# Patient Record
Sex: FEMALE | Race: BLACK OR AFRICAN AMERICAN | NOT HISPANIC OR LATINO | Employment: UNEMPLOYED | URBAN - METROPOLITAN AREA
[De-identification: names, ages, dates, MRNs, and addresses within clinical notes are randomized per-mention and may not be internally consistent; named-entity substitution may affect disease eponyms.]

---

## 2021-08-20 ENCOUNTER — CLINICAL SUPPORT (OUTPATIENT)
Dept: URGENT CARE | Facility: CLINIC | Age: 21
End: 2021-08-20
Payer: MEDICAID

## 2021-08-20 ENCOUNTER — TELEPHONE (OUTPATIENT)
Dept: URGENT CARE | Facility: CLINIC | Age: 21
End: 2021-08-20

## 2021-08-20 DIAGNOSIS — Z11.59 ENCOUNTER FOR SCREENING FOR OTHER VIRAL DISEASES: Primary | ICD-10-CM

## 2021-08-20 DIAGNOSIS — U07.1 COVID-19 VIRUS INFECTION: Primary | ICD-10-CM

## 2021-08-20 LAB
CTP QC/QA: YES
SARS-COV-2 RDRP RESP QL NAA+PROBE: POSITIVE

## 2021-08-20 PROCEDURE — 99211 PR OFFICE/OUTPT VISIT, EST, LEVL I: ICD-10-PCS | Mod: S$GLB,,, | Performed by: NURSE PRACTITIONER

## 2021-08-20 PROCEDURE — U0002 COVID-19 LAB TEST NON-CDC: HCPCS | Mod: QW,S$GLB,, | Performed by: NURSE PRACTITIONER

## 2021-08-20 PROCEDURE — 99211 OFF/OP EST MAY X REQ PHY/QHP: CPT | Mod: S$GLB,,, | Performed by: NURSE PRACTITIONER

## 2021-08-20 PROCEDURE — U0002: ICD-10-PCS | Mod: QW,S$GLB,, | Performed by: NURSE PRACTITIONER

## 2022-03-29 ENCOUNTER — PATIENT MESSAGE (OUTPATIENT)
Dept: RESEARCH | Facility: HOSPITAL | Age: 22
End: 2022-03-29
Payer: MEDICAID

## 2023-09-16 ENCOUNTER — HOSPITAL ENCOUNTER (EMERGENCY)
Facility: HOSPITAL | Age: 23
Discharge: HOME | End: 2023-09-16
Attending: EMERGENCY MEDICINE
Payer: MEDICAID

## 2023-09-16 VITALS
DIASTOLIC BLOOD PRESSURE: 82 MMHG | OXYGEN SATURATION: 99 % | RESPIRATION RATE: 18 BRPM | HEART RATE: 91 BPM | BODY MASS INDEX: 20.55 KG/M2 | HEIGHT: 63 IN | WEIGHT: 116 LBS | TEMPERATURE: 98 F | SYSTOLIC BLOOD PRESSURE: 126 MMHG

## 2023-09-16 DIAGNOSIS — F25.0 SCHIZOAFFECTIVE DISORDER, BIPOLAR TYPE: Primary | ICD-10-CM

## 2023-09-16 DIAGNOSIS — G47.00 INSOMNIA, UNSPECIFIED TYPE: ICD-10-CM

## 2023-09-16 LAB
ALBUMIN SERPL BCP-MCNC: 4.4 G/DL (ref 3.5–5.2)
ALP SERPL-CCNC: 68 U/L (ref 55–135)
ALT SERPL W/O P-5'-P-CCNC: 12 U/L (ref 10–44)
AMPHET+METHAMPHET UR QL: NEGATIVE
ANION GAP SERPL CALC-SCNC: 10 MMOL/L (ref 8–16)
APAP SERPL-MCNC: 11 UG/ML (ref 10–20)
AST SERPL-CCNC: 14 U/L (ref 10–40)
B-HCG UR QL: NEGATIVE
BARBITURATES UR QL SCN>200 NG/ML: NEGATIVE
BASOPHILS # BLD AUTO: 0.09 K/UL (ref 0–0.2)
BASOPHILS NFR BLD: 1.1 % (ref 0–1.9)
BENZODIAZ UR QL SCN>200 NG/ML: NEGATIVE
BILIRUB SERPL-MCNC: 0.6 MG/DL (ref 0.1–1)
BILIRUB UR QL STRIP: NEGATIVE
BUN SERPL-MCNC: 6 MG/DL (ref 6–20)
BZE UR QL SCN: NEGATIVE
CALCIUM SERPL-MCNC: 9.6 MG/DL (ref 8.7–10.5)
CANNABINOIDS UR QL SCN: NEGATIVE
CHLORIDE SERPL-SCNC: 104 MMOL/L (ref 95–110)
CLARITY UR: ABNORMAL
CO2 SERPL-SCNC: 22 MMOL/L (ref 23–29)
COLOR UR: YELLOW
CREAT SERPL-MCNC: 0.8 MG/DL (ref 0.5–1.4)
CREAT UR-MCNC: 165.7 MG/DL (ref 15–325)
CTP QC/QA: YES
DIFFERENTIAL METHOD: NORMAL
EOSINOPHIL # BLD AUTO: 0.1 K/UL (ref 0–0.5)
EOSINOPHIL NFR BLD: 1.7 % (ref 0–8)
ERYTHROCYTE [DISTWIDTH] IN BLOOD BY AUTOMATED COUNT: 12.9 % (ref 11.5–14.5)
EST. GFR  (NO RACE VARIABLE): >60 ML/MIN/1.73 M^2
ETHANOL SERPL-MCNC: <10 MG/DL
GLUCOSE SERPL-MCNC: 85 MG/DL (ref 70–110)
GLUCOSE UR QL STRIP: NEGATIVE
HCT VFR BLD AUTO: 39.1 % (ref 37–48.5)
HGB BLD-MCNC: 12.6 G/DL (ref 12–16)
HGB UR QL STRIP: NEGATIVE
IMM GRANULOCYTES # BLD AUTO: 0.03 K/UL (ref 0–0.04)
IMM GRANULOCYTES NFR BLD AUTO: 0.4 % (ref 0–0.5)
KETONES UR QL STRIP: NEGATIVE
LEUKOCYTE ESTERASE UR QL STRIP: NEGATIVE
LYMPHOCYTES # BLD AUTO: 1.7 K/UL (ref 1–4.8)
LYMPHOCYTES NFR BLD: 22.2 % (ref 18–48)
MCH RBC QN AUTO: 28.3 PG (ref 27–31)
MCHC RBC AUTO-ENTMCNC: 32.2 G/DL (ref 32–36)
MCV RBC AUTO: 88 FL (ref 82–98)
METHADONE UR QL SCN>300 NG/ML: NEGATIVE
MONOCYTES # BLD AUTO: 0.7 K/UL (ref 0.3–1)
MONOCYTES NFR BLD: 8.7 % (ref 4–15)
NEUTROPHILS # BLD AUTO: 5.2 K/UL (ref 1.8–7.7)
NEUTROPHILS NFR BLD: 65.9 % (ref 38–73)
NITRITE UR QL STRIP: NEGATIVE
NRBC BLD-RTO: 0 /100 WBC
OPIATES UR QL SCN: NEGATIVE
PCP UR QL SCN>25 NG/ML: NEGATIVE
PH UR STRIP: 6 [PH] (ref 5–8)
PLATELET # BLD AUTO: 226 K/UL (ref 150–450)
PMV BLD AUTO: 9.7 FL (ref 9.2–12.9)
POTASSIUM SERPL-SCNC: 4.1 MMOL/L (ref 3.5–5.1)
PROT SERPL-MCNC: 7.5 G/DL (ref 6–8.4)
PROT UR QL STRIP: NEGATIVE
RBC # BLD AUTO: 4.46 M/UL (ref 4–5.4)
SODIUM SERPL-SCNC: 136 MMOL/L (ref 136–145)
SP GR UR STRIP: 1.02 (ref 1–1.03)
TOXICOLOGY INFORMATION: NORMAL
TSH SERPL DL<=0.005 MIU/L-ACNC: 0.88 UIU/ML (ref 0.4–4)
URN SPEC COLLECT METH UR: ABNORMAL
UROBILINOGEN UR STRIP-ACNC: NEGATIVE EU/DL
WBC # BLD AUTO: 7.85 K/UL (ref 3.9–12.7)

## 2023-09-16 PROCEDURE — 85025 COMPLETE CBC W/AUTO DIFF WBC: CPT | Performed by: EMERGENCY MEDICINE

## 2023-09-16 PROCEDURE — 81003 URINALYSIS AUTO W/O SCOPE: CPT | Performed by: EMERGENCY MEDICINE

## 2023-09-16 PROCEDURE — 80307 DRUG TEST PRSMV CHEM ANLYZR: CPT | Performed by: EMERGENCY MEDICINE

## 2023-09-16 PROCEDURE — 81025 URINE PREGNANCY TEST: CPT | Performed by: EMERGENCY MEDICINE

## 2023-09-16 PROCEDURE — 99284 EMERGENCY DEPT VISIT MOD MDM: CPT

## 2023-09-16 PROCEDURE — 80053 COMPREHEN METABOLIC PANEL: CPT | Performed by: EMERGENCY MEDICINE

## 2023-09-16 PROCEDURE — 82077 ASSAY SPEC XCP UR&BREATH IA: CPT | Performed by: EMERGENCY MEDICINE

## 2023-09-16 PROCEDURE — 84443 ASSAY THYROID STIM HORMONE: CPT | Performed by: EMERGENCY MEDICINE

## 2023-09-16 PROCEDURE — 25000003 PHARM REV CODE 250: Performed by: EMERGENCY MEDICINE

## 2023-09-16 PROCEDURE — 80143 DRUG ASSAY ACETAMINOPHEN: CPT | Performed by: EMERGENCY MEDICINE

## 2023-09-16 RX ORDER — ARIPIPRAZOLE 5 MG/1
5 TABLET ORAL DAILY
Qty: 30 TABLET | Refills: 1 | Status: ON HOLD | OUTPATIENT
Start: 2023-09-16 | End: 2023-12-27 | Stop reason: HOSPADM

## 2023-09-16 RX ORDER — OXCARBAZEPINE 150 MG/1
150 TABLET, FILM COATED ORAL 2 TIMES DAILY
Qty: 60 TABLET | Refills: 1 | Status: SHIPPED | OUTPATIENT
Start: 2023-09-16 | End: 2024-09-15

## 2023-09-16 RX ORDER — MIRTAZAPINE 15 MG/1
15 TABLET, FILM COATED ORAL NIGHTLY
Qty: 30 TABLET | Refills: 1 | Status: ON HOLD | OUTPATIENT
Start: 2023-09-16 | End: 2023-12-27 | Stop reason: HOSPADM

## 2023-09-16 RX ORDER — IBUPROFEN 600 MG/1
600 TABLET ORAL
Status: COMPLETED | OUTPATIENT
Start: 2023-09-16 | End: 2023-09-16

## 2023-09-16 RX ORDER — FLUOXETINE HYDROCHLORIDE 20 MG/1
20 CAPSULE ORAL DAILY
Qty: 30 CAPSULE | Refills: 1 | Status: ON HOLD | OUTPATIENT
Start: 2023-09-16 | End: 2023-12-27 | Stop reason: HOSPADM

## 2023-09-16 RX ADMIN — IBUPROFEN 600 MG: 600 TABLET ORAL at 11:09

## 2023-09-16 NOTE — DISCHARGE INSTRUCTIONS
Follow up with your psychiatrist this week. Return to this ER for any problems.     [facility] [phone], After hours, nights, and weekends, you can reach a primary care on-call provider at [phone] and a behavioral health mobile crisis line at [phone]:  [facility]  [address] [facility]  [address]  [place]  [address] [phone]  Hours: Monday - Friday  7:00 a.m. - 5:00 p.m.          MENTAL HEALTH/ADDICTIVE DISORDERS  REFERRAL RECOMMENDATIONS    - AA ([phone]) [email or web address] or NA ([phone])    - [facility] Intensive Outpatient Program [phone], option 2    - Places for Outpatient Addictive Disorders and Mental Health Treatment in [facility]:    ACER ([place], [place], [place]; accepts Medicaid, commercial insurance) [phone]    ARRNO ([place]) [phone], [address] [facility] [phone]; Crisis [phone] - Call for options A-E:    ? [facility], [address] [facility] [place] [address]    ? [facility], [address] [place] [address]    ? [facility], [address] [place] [address],    ? [facility], 2nd floor [address] [place] [address]    ? [facility] [place], [address], [place] [address]    ? [facility], [address], [place] [address]    [facility], [address] [place] [address], [phone]    [facility], [place] [phone]    [facility] (Mental & General), [facility], [phone]    [facility] (Mental & General Health, not only HIV+, 3 MATHEUS locations) [phone]    [facility] [place], [address] [facility], [address], [phone]     [facility] [place], [address] [facility] [place], [phone], [phone]    [facility] (Methadone Maintenance)    ? [address] [place] [address], [phone]    ? [place] [address] [phone]    - Addiction and Mental Health Treatment in Other [facility]:    [facility] [place], [address] [place], [phone]    [facility], [phone], [address] [facility], [address], [phone]    [facility]. [address] [facility], [phone]    [facility], [address] [facility], [phone]    [facility] HSA    ? [facility], [address] [facility], [phone] ([facility])    ? [facility], [address] [facility], [phone] ([facility])    ? [facility], [address] [facility], [address], [place], [phone] ([place], [place], [facility])    ? [facility], [address], [place], [phone] ([facility])    [facility] - [facility] Hotline [phone], [phone]    ? [facility], [address] [place]    ? [facility], [address], [place]    ? [facility], [address] [facility], [place]    ? [facility], [address] [facility] [address] [place]    ? [facility], [address], [place]    [facility], [address], [place] [phone]    [facility], [address] [facility] [phone]    [facility], [address] [facility], [phone]    [facility], [address] [facility], [phone]    - Residential Addictive Disorders Treatment (call every day until you get  in):    [facility] [address] [facility], [phone]    [facility] (men only) [address] [facility], [place], [phone]    [facility], [address] [facility], mens program [phone], womens program [phone]    [facility], [address], [phone]    [facility] (Female only) [address] [facility], [phone]    [facility] (IOP, residential, low cost, MCaid) [address], [phone]    [facility] (Men only, [phone]), [address] [facility] [address], [place]    [facility] (Pregnant/women with or without children, [phone])    [facility] (Women only), [address] [facility], [phone]    [facility] (men only), [place] [phone]    - Inpatient Rehabs (out of area)    [facility], [phone]     [facility], [phone]    [facility] [place], [phone]    [place], [place] ([phone])    [place] (nr [place]) [phone]    *In case of suicidal thinking, return to ED and/or call or text the COPE LINE at [phone]*

## 2023-09-16 NOTE — ED PROVIDER NOTES
Encounter Date: 9/16/2023       History     Chief Complaint   Patient presents with    Psychiatric Evaluation     EMS called to 21yo female that has been having issues sleeping x2 weeks and began having AH yesterday. Has not had her meds x3 days. Hx of schizophrenia and bipolar. Also reports some abdominal pain and nausea today.      22-year-old female who has schizoaffective and bipolar disorder states [facility].  She has not been on her medications since she was [place] to [place] them.  She states she sometimes hears hallucinations specifically people laughing.  She is not currently hearing hallucinations at this time.  She denies feeling persecuted by them.  She has denies any suicidal ideation.  She denies any homicidal ideation.  Her main complaint is insomnia.  She is currently staying at a friend's in sleeping on their couch.  She admits that environment might be related to why she is having trouble sleeping.  She asked to go back to [facility] so they can send her prescriptions to a pharmacy closer to where she is staying.       Review of patient's allergies indicates:  No Known Allergies  Past Medical History:   Diagnosis Date    Psychiatric disorder      No past surgical history on file.  No family history on file.     Review of Systems   Constitutional:  Negative for fever.   HENT:  Negative for sore throat.    Eyes:  Negative for visual disturbance.   Respiratory:  Negative for shortness of breath.    Cardiovascular:  Negative for chest pain.   Gastrointestinal:  Negative for abdominal pain.   Genitourinary:  Negative for difficulty urinating.   Musculoskeletal:  Negative for back pain.   Skin:  Negative for rash.   Neurological:  Negative for headaches.   Psychiatric/Behavioral:  Positive for sleep disturbance. Negative for behavioral problems, confusion, self-injury  and suicidal ideas. The patient is not nervous/anxious.        Physical Exam     Initial Vitals [09/16/23 0754]   BP Pulse Resp Temp SpO2   126/82 91 18 98.3 °F (36.8 °C) 99 %      MAP       --         Physical Exam    Nursing note and vitals reviewed.  Constitutional: She appears well-developed and well-nourished.   Eyes: EOM are normal. Pupils are equal, round, and reactive to light.   Neck: Neck supple. No thyromegaly present. No JVD present.   Normal range of motion.  Cardiovascular:  Normal rate, regular rhythm, normal heart sounds and intact distal pulses.     Exam reveals no gallop and no friction rub.       No murmur heard.  Pulmonary/Chest: Breath sounds normal. No respiratory distress.   Abdominal: Abdomen is soft. Bowel sounds are normal. There is no abdominal tenderness.   Musculoskeletal:         General: No tenderness or edema. Normal range of motion.      Cervical back: Normal range of motion and neck supple.     Neurological: She is alert and oriented to person, place, and time. She has normal strength.   Skin: Skin is warm and dry.   Psychiatric:   Patient has good eye contact.  She does not appear to be pressed.  She does not appear to be anxious.  She is not appear to be psychotic.  She is not reacting to internal stimuli.         ED Course   Procedures  Labs Reviewed   COMPREHENSIVE METABOLIC PANEL - Abnormal; Notable for the following components:       Result Value    CO2 22 (*)     All other components within normal limits   URINALYSIS, REFLEX TO URINE CULTURE - Abnormal; Notable for the following components:    Appearance, UA Hazy (*)     All other components within normal limits    Narrative:     Specimen Source->Urine   CBC W/ AUTO DIFFERENTIAL   TSH   DRUG SCREEN PANEL, URINE EMERGENCY    Narrative:     Specimen Source->Urine   ALCOHOL,MEDICAL (ETHANOL)   ACETAMINOPHEN LEVEL   POCT URINE PREGNANCY          Imaging Results    None          Medications   ibuprofen tablet 600 mg (600 mg Oral  Given 9/16/23 1135)     Medical Decision Making  Amount and/or Complexity of Data Reviewed  Labs: ordered.    Risk  Prescription drug management.    I called the pharmacy she states her prescriptions were at.  They had 4 prescriptions ready to be filled.  I will refill these medications and patient is asking for them in paper script to get them filled.  She states she will start taking them.  She states she has a psychiatrist to follow up with.                           Clinical Impression:   Final diagnoses:  [F25.0] Schizoaffective disorder, bipolar type (Primary)  [G47.00] Insomnia, unspecified type        ED Disposition Condition    Discharge Stable          ED Prescriptions       Medication Sig Dispense Start Date End Date Auth. Provider    OXcarbazepine (TRILEPTAL) 150 MG Tab Take 1 tablet (150 mg total) by mouth 2 (two) times daily. 60 tablet 9/16/2023 9/15/2024 Simba Johnson MD    mirtazapine (REMERON) 15 MG tablet Take 1 tablet (15 mg total) by mouth every evening. 30 tablet 9/16/2023 9/15/2024 Simba Johnson MD    ARIPiprazole (ABILIFY) 5 MG Tab Take 1 tablet (5 mg total) by mouth once daily. 30 tablet 9/16/2023 9/15/2024 Simba Johnson MD    FLUoxetine 20 MG capsule Take 1 capsule (20 mg total) by mouth once daily. 30 capsule 9/16/2023 9/15/2024 Simba Johnson MD          Follow-up Information       Follow up With Specialties Details Why Contact Info    [facility] -  Schedule an appointment as soon as possible for a visit   [address] [facility]  [address]  [place] [address]  [phone]      [facility], Psychiatry, Psychology Schedule an appointment as soon as possible for a visit   [address]  [place] [address]  [phone]               Simba Johnson MD  09/16/23 8434

## 2023-12-18 ENCOUNTER — HOSPITAL ENCOUNTER (EMERGENCY)
Facility: OTHER | Age: 23
Discharge: PSYCHIATRIC OR SUBSTANCE USE FACILITY | End: 2023-12-18
Attending: EMERGENCY MEDICINE
Payer: MEDICAID

## 2023-12-18 VITALS
OXYGEN SATURATION: 99 % | SYSTOLIC BLOOD PRESSURE: 122 MMHG | DIASTOLIC BLOOD PRESSURE: 60 MMHG | TEMPERATURE: 98 F | WEIGHT: 125 LBS | BODY MASS INDEX: 22.15 KG/M2 | HEIGHT: 63 IN | HEART RATE: 82 BPM | RESPIRATION RATE: 18 BRPM

## 2023-12-18 DIAGNOSIS — Z00.8 MEDICAL CLEARANCE FOR PSYCHIATRIC ADMISSION: Primary | ICD-10-CM

## 2023-12-18 DIAGNOSIS — F23 ACUTE PSYCHOSIS: ICD-10-CM

## 2023-12-18 LAB
ALBUMIN SERPL BCP-MCNC: 4.5 G/DL (ref 3.5–5.2)
ALP SERPL-CCNC: 71 U/L (ref 55–135)
ALT SERPL W/O P-5'-P-CCNC: 11 U/L (ref 10–44)
AMPHET+METHAMPHET UR QL: NEGATIVE
ANION GAP SERPL CALC-SCNC: 15 MMOL/L (ref 8–16)
APAP SERPL-MCNC: <3 UG/ML (ref 10–20)
AST SERPL-CCNC: 17 U/L (ref 10–40)
B-HCG UR QL: NEGATIVE
BARBITURATES UR QL SCN>200 NG/ML: NEGATIVE
BASOPHILS # BLD AUTO: 0.11 K/UL (ref 0–0.2)
BASOPHILS NFR BLD: 1 % (ref 0–1.9)
BENZODIAZ UR QL SCN>200 NG/ML: NEGATIVE
BILIRUB SERPL-MCNC: 0.7 MG/DL (ref 0.1–1)
BILIRUB UR QL STRIP: NEGATIVE
BUN SERPL-MCNC: 12 MG/DL (ref 6–20)
BZE UR QL SCN: ABNORMAL
CALCIUM SERPL-MCNC: 9.7 MG/DL (ref 8.7–10.5)
CANNABINOIDS UR QL SCN: NEGATIVE
CHLORIDE SERPL-SCNC: 106 MMOL/L (ref 95–110)
CLARITY UR: CLEAR
CO2 SERPL-SCNC: 18 MMOL/L (ref 23–29)
COLOR UR: YELLOW
CREAT SERPL-MCNC: 0.9 MG/DL (ref 0.5–1.4)
CREAT UR-MCNC: 246.4 MG/DL (ref 15–325)
CTP QC/QA: YES
DIFFERENTIAL METHOD: NORMAL
EOSINOPHIL # BLD AUTO: 0.1 K/UL (ref 0–0.5)
EOSINOPHIL NFR BLD: 1.3 % (ref 0–8)
ERYTHROCYTE [DISTWIDTH] IN BLOOD BY AUTOMATED COUNT: 12.6 % (ref 11.5–14.5)
EST. GFR  (NO RACE VARIABLE): >60 ML/MIN/1.73 M^2
ETHANOL SERPL-MCNC: <10 MG/DL
GLUCOSE SERPL-MCNC: 73 MG/DL (ref 70–110)
GLUCOSE UR QL STRIP: NEGATIVE
HCT VFR BLD AUTO: 39 % (ref 37–48.5)
HCV AB SERPL QL IA: NEGATIVE
HGB BLD-MCNC: 12.6 G/DL (ref 12–16)
HGB UR QL STRIP: NEGATIVE
HIV 1+2 AB+HIV1 P24 AG SERPL QL IA: NEGATIVE
IMM GRANULOCYTES # BLD AUTO: 0.02 K/UL (ref 0–0.04)
IMM GRANULOCYTES NFR BLD AUTO: 0.2 % (ref 0–0.5)
KETONES UR QL STRIP: ABNORMAL
LEUKOCYTE ESTERASE UR QL STRIP: NEGATIVE
LYMPHOCYTES # BLD AUTO: 2.2 K/UL (ref 1–4.8)
LYMPHOCYTES NFR BLD: 20.2 % (ref 18–48)
MCH RBC QN AUTO: 28.1 PG (ref 27–31)
MCHC RBC AUTO-ENTMCNC: 32.3 G/DL (ref 32–36)
MCV RBC AUTO: 87 FL (ref 82–98)
METHADONE UR QL SCN>300 NG/ML: NEGATIVE
MONOCYTES # BLD AUTO: 0.9 K/UL (ref 0.3–1)
MONOCYTES NFR BLD: 8.6 % (ref 4–15)
NEUTROPHILS # BLD AUTO: 7.5 K/UL (ref 1.8–7.7)
NEUTROPHILS NFR BLD: 68.7 % (ref 38–73)
NITRITE UR QL STRIP: NEGATIVE
NRBC BLD-RTO: 0 /100 WBC
OPIATES UR QL SCN: NEGATIVE
PCP UR QL SCN>25 NG/ML: NEGATIVE
PH UR STRIP: 6 [PH] (ref 5–8)
PLATELET # BLD AUTO: 300 K/UL (ref 150–450)
PMV BLD AUTO: 10.4 FL (ref 9.2–12.9)
POTASSIUM SERPL-SCNC: 3.7 MMOL/L (ref 3.5–5.1)
PROT SERPL-MCNC: 8.4 G/DL (ref 6–8.4)
PROT UR QL STRIP: ABNORMAL
RBC # BLD AUTO: 4.48 M/UL (ref 4–5.4)
SODIUM SERPL-SCNC: 139 MMOL/L (ref 136–145)
SP GR UR STRIP: >1.03 (ref 1–1.03)
TOXICOLOGY INFORMATION: ABNORMAL
TSH SERPL DL<=0.005 MIU/L-ACNC: 1.26 UIU/ML (ref 0.4–4)
URN SPEC COLLECT METH UR: ABNORMAL
UROBILINOGEN UR STRIP-ACNC: ABNORMAL EU/DL
WBC # BLD AUTO: 10.84 K/UL (ref 3.9–12.7)

## 2023-12-18 PROCEDURE — 80307 DRUG TEST PRSMV CHEM ANLYZR: CPT

## 2023-12-18 PROCEDURE — 81025 URINE PREGNANCY TEST: CPT

## 2023-12-18 PROCEDURE — 99285 EMERGENCY DEPT VISIT HI MDM: CPT

## 2023-12-18 PROCEDURE — 96372 THER/PROPH/DIAG INJ SC/IM: CPT

## 2023-12-18 PROCEDURE — 82077 ASSAY SPEC XCP UR&BREATH IA: CPT

## 2023-12-18 PROCEDURE — 80143 DRUG ASSAY ACETAMINOPHEN: CPT

## 2023-12-18 PROCEDURE — 63600175 PHARM REV CODE 636 W HCPCS

## 2023-12-18 PROCEDURE — 85025 COMPLETE CBC W/AUTO DIFF WBC: CPT

## 2023-12-18 PROCEDURE — 80053 COMPREHEN METABOLIC PANEL: CPT

## 2023-12-18 PROCEDURE — 86803 HEPATITIS C AB TEST: CPT | Performed by: EMERGENCY MEDICINE

## 2023-12-18 PROCEDURE — 84443 ASSAY THYROID STIM HORMONE: CPT

## 2023-12-18 PROCEDURE — 87389 HIV-1 AG W/HIV-1&-2 AB AG IA: CPT | Performed by: EMERGENCY MEDICINE

## 2023-12-18 PROCEDURE — 81003 URINALYSIS AUTO W/O SCOPE: CPT | Mod: 59

## 2023-12-18 RX ORDER — LORAZEPAM 2 MG/ML
2 INJECTION INTRAMUSCULAR ONCE AS NEEDED
Status: COMPLETED | OUTPATIENT
Start: 2023-12-18 | End: 2023-12-18

## 2023-12-18 RX ORDER — DIPHENHYDRAMINE HYDROCHLORIDE 50 MG/ML
25 INJECTION INTRAMUSCULAR; INTRAVENOUS ONCE AS NEEDED
Status: COMPLETED | OUTPATIENT
Start: 2023-12-18 | End: 2023-12-18

## 2023-12-18 RX ORDER — ZIPRASIDONE MESYLATE 20 MG/ML
10 INJECTION, POWDER, LYOPHILIZED, FOR SOLUTION INTRAMUSCULAR ONCE AS NEEDED
Status: COMPLETED | OUTPATIENT
Start: 2023-12-18 | End: 2023-12-18

## 2023-12-18 RX ADMIN — LORAZEPAM 2 MG: 2 INJECTION INTRAMUSCULAR; INTRAVENOUS at 10:12

## 2023-12-18 RX ADMIN — ZIPRASIDONE MESYLATE 10 MG: 20 INJECTION, POWDER, LYOPHILIZED, FOR SOLUTION INTRAMUSCULAR at 10:12

## 2023-12-18 RX ADMIN — DIPHENHYDRAMINE HYDROCHLORIDE 25 MG: 50 INJECTION, SOLUTION INTRAMUSCULAR; INTRAVENOUS at 10:12

## 2023-12-18 NOTE — ED PROVIDER NOTES
"Source of History:  Patient     Chief complaint:  Hallucinations (Pt brought in by [facility] with auditory and visual hallucinations starting yesterday. No SI/HI. Hx of schizophrenia, not currently taking any medications)      HPI:  Jailene Todd is a 22 y.o. female with a history of schizoaffective disorder, bipolar type presenting via mobile crisis unit with auditory and visual hallucinations.  Patient states that she always has these voices in her head.  She states that she has spirits inside of me. .  She states that the spirits inside of her used to be good but now they are evil.  She admits to auditory hallucinations of voices threatening to hurt her.  She denies any suicidal or homicidal ideations.  She reports that she is  not taking her medications for schizophrenia.  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 She states that she did not want to come here but her mother called the most we will crisis unit.  She states that she has been under a lot of stress lately and "it's just too much going on."She denies any physical signs or symptoms today, reporting that she feels at baseline.  Denies any pain anywhere.  Denies any signs or symptoms of COVID.  Spoke with patient's mother to obtain collateral history.  Mother states that patient had an episode this morning saying someone was trying to harm her. Began screaming and cursing at an imaginary person, was punching herself, threw bleach/water on the floor, saying she was not going to be here long. Mother is unsure of medications. Symptoms began in August of this year and she was diagnosed with schizophrenia. Mother reports that patient has been using ecstasy pills.     This is the extent to the patients complaints today here in the emergency department.    PMH:  As per HPI and below:  Past Medical History:   Diagnosis Date    Psychiatric disorder      No past surgical history on " "file.       Review of patient's allergies indicates:  No Known Allergies    ROS: As per HPI and below:  Constitutional: No fever.  No chills.  Eyes: No visual changes.  ENT: No sore throat. No ear pain    Cardiovascular: No chest pain.  Respiratory: No shortness of breath.  GI: No abdominal pain.  No nausea or vomiting.  Genitourinary: No abnormal urination.  Neurologic: No headache. No focal weakness.  No numbness.  MSK: no back pain.  Psychiatric:  Positive for auditory and visual hallucinations.      Physical Exam:    /62 (BP Location: Left arm, Patient Position: Sitting)   Pulse 88   Temp 98.9 °F (37.2 °C) (Oral)   Resp 18   Ht 5' 3" (1.6 m)   Wt 56.7 kg (125 lb)   SpO2 100%   BMI 22.14 kg/m²   Vitals:    12/18/23 0913 12/18/23 1205   BP: 116/71 112/62   Pulse: 101 88   Resp: 20 18   Temp: 98.9 °F (37.2 °C)    TempSrc: Oral    SpO2: 100% 100%   Weight: 56.7 kg (125 lb)    Height: 5' 3" (1.6 m)      Nursing note and vital signs reviewed.  Constitutional: No acute distress.  Eyes: No conjunctival injection.  Extraocular muscles are intact.  ENT: Oropharynx clear.    Cardiovascular: Regular rate and rhythm.  No murmurs. No gallops. No rubs.  Respiratory: Clear to auscultation bilaterally.  Good air movement.  No wheezes.  No rhonchi. No rales. No accessory muscle use.  Abdomen: Soft.  Not distended.  Nontender.  No guarding.  No rebound. Non-peritoneal.  Musculoskeletal: Good range of motion all joints.  No deformities.  Neck supple.  No meningismus.  Skin: No rashes seen.  Good turgor.  No abrasions.  No ecchymoses.  Neurologic:  Alert and oriented x3.  No focal neurological deficits.  Psychiatry:  Appears to be responding to internal stimuli on exam.  Mumbling to self at times but does respond appropriately to questions.  Becomes verbally aggressive at times, yelling when she learned she would need to stay at the hospital.  Denies SI/HI.  Reports delusions of "spirits inside of me."    Labs that have " been ordered have been independently reviewed and interpreted by myself.    Initial MDM:  Urgent evaluation of 22-year-old female presenting for evaluation of auditory and visual hallucinations.  Patient with history of schizoaffective disorder with bipolar type that was recently diagnosed in August of this year per patient's mother.  Patient reports that she has not taking any of her medications.  Mother reports that patient had episode this morning beginning at 3:00 a.m. of screaming, spilling bleach on the floor, punching herself, and threatening self-harm.  On arrival, patient was afebrile and hemodynamically stable.  On my interview, patient endorses auditory hallucinations of voices threatening her.  She denies any SI or HI.  At times she does mumble to herself and appears to be responding to internal stimuli.  She does answer questions appropriately.  She became verbally aggressive, screaming at staff members and throwing her purse across the room when she learned that she would need to stay for psychiatric evaluation. She was agreeable to receiving IM Geodon, Ativan, Benadryl for aggressive behavior.  Plan for routine psychiatric labs, pec.    Labs Reviewed   COMPREHENSIVE METABOLIC PANEL - Abnormal; Notable for the following components:       Result Value    CO2 18 (*)     All other components within normal limits    Narrative:     Release to patient->Immediate   URINALYSIS, REFLEX TO URINE CULTURE - Abnormal; Notable for the following components:    Specific Gravity, UA >1.030 (*)     Protein, UA Trace (*)     Ketones, UA 3+ (*)     Urobilinogen, UA 2.0-3.0 (*)     All other components within normal limits    Narrative:     Specimen Source->Urine   DRUG SCREEN PANEL, URINE EMERGENCY - Abnormal; Notable for the following components:    Cocaine (Metab.) Presumptive Positive (*)     All other components within normal limits    Narrative:     Specimen Source->Urine   ACETAMINOPHEN LEVEL - Abnormal; Notable for  the following components:    Acetaminophen (Tylenol), Serum <3.0 (*)     All other components within normal limits    Narrative:     Release to patient->Immediate   HIV 1 / 2 ANTIBODY    Narrative:     Release to patient->Immediate   HEPATITIS C ANTIBODY    Narrative:     Release to patient->Immediate   CBC W/ AUTO DIFFERENTIAL    Narrative:     Release to patient->Immediate   TSH    Narrative:     Release to patient->Immediate   ALCOHOL,MEDICAL (ETHANOL)    Narrative:     Release to patient->Immediate   POCT URINE PREGNANCY       No orders to display       MDM/ Differential Dx:  Decompensated psychiatric disease (schizophrenia, bipolar disorder, major depression), excited delirium, medication noncompliance, substance abuse/withdrawal, intentional drug overdose, medication toxicity, APAP/ASA overdose, acute stress reaction, personality disorder, malingering, metabolic derangement        ED Course as of 12/18/23 1426   Mon Dec 18, 2023   1424 Preg Test, Ur: Negative [SW]   1424 UA consistent with dehydration, no evidence of UTI. [SW]   1424 Cocaine (Metab.)(!): Presumptive Positive [SW]   1424 UDS positive for cocaine, otherwise negative. [SW]   1424 HIV 1/2 Ag/Ab: Negative [SW]   1424 Hepatitis C Ab: Negative [SW]   1424 TSH: 1.262 [SW]   1425 Alcohol, Serum: <10 [SW]   1425 Acetaminophen (Tylenol), Serum(!): <3.0 [SW]   1425 CBC within normal limits.  CMP largely unremarkable. [SW]   1425 Patient is cleared for transfer to psychiatric facility. [SW]      ED Course User Index  [SW] Jenae Goodman PA-C           Diagnostic Impression:    1. Medical clearance for psychiatric admission    2. Acute psychosis         ED Disposition Condition    Transfer to Psych Facility Stable            ED Prescriptions    None       Follow-up Information    None          Jenae Goodman PA-C  12/18/23 1420

## 2023-12-18 NOTE — ED NOTES
Pt arrived to ED via [facility] complaining of auditory and visual hallucinations, pt states that her mother was the one who called and that she has no intentions of harming herself or anyone else. Pt states that she is non compliant with all her meds because they don't help her on a spiritual level. She admits to seeing spirits and that she need help on a spiritual lever. Pt becomes tearful and visibly agitated when explained process.

## 2023-12-18 NOTE — ED NOTES
Pt belongings:    1 Purple iphone  One black max-Rentamus phone  One key chain pink  accessories  1 ID card  Cig lighter  Brown purse  Jeans  Brown boots  Vazquez long sleeve shirt  One pink jacket

## 2023-12-27 PROBLEM — F14.10 COCAINE USE DISORDER: Status: ACTIVE | Noted: 2023-12-27

## 2023-12-27 PROBLEM — F20.0 PARANOID SCHIZOPHRENIA: Status: ACTIVE | Noted: 2023-12-27

## 2024-07-30 ENCOUNTER — HOSPITAL ENCOUNTER (EMERGENCY)
Facility: OTHER | Age: 24
Discharge: LEFT BEFORE DISCHARGE | End: 2024-07-30
Attending: INTERNAL MEDICINE
Payer: MEDICAID

## 2024-07-30 VITALS
HEIGHT: 63 IN | RESPIRATION RATE: 17 BRPM | OXYGEN SATURATION: 100 % | WEIGHT: 120 LBS | DIASTOLIC BLOOD PRESSURE: 81 MMHG | TEMPERATURE: 98 F | HEART RATE: 117 BPM | SYSTOLIC BLOOD PRESSURE: 123 MMHG | BODY MASS INDEX: 21.26 KG/M2

## 2024-07-30 DIAGNOSIS — B96.89 BV (BACTERIAL VAGINOSIS): Primary | ICD-10-CM

## 2024-07-30 DIAGNOSIS — N76.0 BV (BACTERIAL VAGINOSIS): Primary | ICD-10-CM

## 2024-07-30 DIAGNOSIS — F43.10 PTSD (POST-TRAUMATIC STRESS DISORDER): ICD-10-CM

## 2024-07-30 DIAGNOSIS — Z11.3 SCREENING EXAMINATION FOR STI: ICD-10-CM

## 2024-07-30 LAB
BACTERIA GENITAL QL WET PREP: ABNORMAL
BILIRUB UR QL STRIP: NEGATIVE
C TRACH DNA SPEC QL NAA+PROBE: NOT DETECTED
CLARITY UR: CLEAR
CLUE CELLS VAG QL WET PREP: ABNORMAL
COLOR UR: YELLOW
FILAMENT FUNGI VAG WET PREP-#/AREA: ABNORMAL
GLUCOSE UR QL STRIP: NEGATIVE
HGB UR QL STRIP: NEGATIVE
KETONES UR QL STRIP: NEGATIVE
LEUKOCYTE ESTERASE UR QL STRIP: ABNORMAL
MICROSCOPIC COMMENT: ABNORMAL
N GONORRHOEA DNA SPEC QL NAA+PROBE: NOT DETECTED
NITRITE UR QL STRIP: NEGATIVE
PH UR STRIP: 8 [PH] (ref 5–8)
PROT UR QL STRIP: ABNORMAL
RBC #/AREA URNS HPF: 4 /HPF (ref 0–4)
SP GR UR STRIP: 1.03 (ref 1–1.03)
SPECIMEN SOURCE: ABNORMAL
SQUAMOUS #/AREA URNS HPF: 11 /HPF
T VAGINALIS GENITAL QL WET PREP: ABNORMAL
URN SPEC COLLECT METH UR: ABNORMAL
UROBILINOGEN UR STRIP-ACNC: ABNORMAL EU/DL
WBC #/AREA URNS HPF: 9 /HPF (ref 0–5)
WBC #/AREA VAG WET PREP: ABNORMAL
YEAST GENITAL QL WET PREP: ABNORMAL

## 2024-07-30 PROCEDURE — 87591 N.GONORRHOEAE DNA AMP PROB: CPT | Performed by: INTERNAL MEDICINE

## 2024-07-30 PROCEDURE — 87491 CHLMYD TRACH DNA AMP PROBE: CPT | Performed by: INTERNAL MEDICINE

## 2024-07-30 PROCEDURE — 99283 EMERGENCY DEPT VISIT LOW MDM: CPT

## 2024-07-30 PROCEDURE — 81000 URINALYSIS NONAUTO W/SCOPE: CPT | Performed by: INTERNAL MEDICINE

## 2024-07-30 PROCEDURE — 87210 SMEAR WET MOUNT SALINE/INK: CPT | Performed by: INTERNAL MEDICINE

## 2024-07-30 RX ORDER — METRONIDAZOLE 500 MG/1
500 TABLET ORAL
Status: DISCONTINUED | OUTPATIENT
Start: 2024-07-30 | End: 2024-07-30 | Stop reason: HOSPADM

## 2024-07-30 RX ORDER — METRONIDAZOLE 500 MG/1
500 TABLET ORAL EVERY 12 HOURS
Qty: 14 TABLET | Refills: 0 | Status: SHIPPED | OUTPATIENT
Start: 2024-07-30 | End: 2024-08-06

## 2024-07-30 NOTE — DISCHARGE INSTRUCTIONS
Diagnosis:   1. BV (bacterial vaginosis)    2. Screening examination for STI    3. PTSD (post-traumatic stress disorder)        Tests you had showed:   Labs Reviewed   VAGINAL SCREEN - Abnormal       Result Value    Trichomonas None      Clue Cells Rare (*)     Budding Yeast None      Fungal Hyphae None      WBC - Vaginal Screen Rare (*)     Bacteria - Vaginal Screen Few (*)     Wet Prep Source Vagina      Narrative:     Release to patient->Immediate   URINALYSIS, REFLEX TO URINE CULTURE - Abnormal    Specimen UA Urine, Clean Catch      Color, UA Yellow      Appearance, UA Clear      pH, UA 8.0      Specific Gravity, UA 1.030      Protein, UA Trace (*)     Glucose, UA Negative      Ketones, UA Negative      Bilirubin (UA) Negative      Occult Blood UA Negative      Nitrite, UA Negative      Urobilinogen, UA 2.0-3.0 (*)     Leukocytes, UA 1+ (*)     Narrative:     Specimen Source->Urine   URINALYSIS MICROSCOPIC - Abnormal    RBC, UA 4      WBC, UA 9 (*)     Squam Epithel, UA 11      Microscopic Comment SEE COMMENT      Narrative:     Specimen Source->Urine   C. TRACHOMATIS/N. GONORRHOEAE BY AMP DNA   POCT URINE PREGNANCY      No orders to display       Treatments you received were:   Medications   metroNIDAZOLE tablet 500 mg (has no administration in time range)       Home Care Instructions:  - Medications: Continue taking your home medications as prescribed    Follow-Up Plan:  - Follow-up with: Primary care doctor within 1-2  days  - Additional testing and/or evaluation will be directed by your primary doctor    Return to the Emergency Department for symptoms including but not limited to: worsening symptoms, severe back pain, shortness of breath or chest pain, vomiting with inability to hold down fluids, blood in vomit or poop, fevers greater than 100.4°F, passing out/fainting/unconsciousness, or other concerning symptoms.     No future appointments.
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
## 2024-08-03 ENCOUNTER — HOSPITAL ENCOUNTER (EMERGENCY)
Facility: OTHER | Age: 24
Discharge: HOME | End: 2024-08-03
Attending: INTERNAL MEDICINE
Payer: MEDICAID

## 2024-08-03 VITALS
HEART RATE: 100 BPM | DIASTOLIC BLOOD PRESSURE: 75 MMHG | RESPIRATION RATE: 18 BRPM | TEMPERATURE: 98 F | OXYGEN SATURATION: 100 % | SYSTOLIC BLOOD PRESSURE: 145 MMHG

## 2024-08-03 DIAGNOSIS — F41.9 ANXIETY: Primary | ICD-10-CM

## 2024-08-03 DIAGNOSIS — F20.9 SCHIZOPHRENIA, UNSPECIFIED TYPE: ICD-10-CM

## 2024-08-03 DIAGNOSIS — F43.9 STRESS: ICD-10-CM

## 2024-08-03 PROCEDURE — 99281 EMR DPT VST MAYX REQ PHY/QHP: CPT

## 2024-08-03 RX ORDER — DIPHENHYDRAMINE HYDROCHLORIDE 50 MG/ML
25 INJECTION INTRAMUSCULAR; INTRAVENOUS
Status: DISCONTINUED | OUTPATIENT
Start: 2024-08-03 | End: 2024-08-03 | Stop reason: HOSPADM

## 2024-08-03 RX ORDER — LORAZEPAM 2 MG/ML
2 INJECTION INTRAMUSCULAR
Status: DISCONTINUED | OUTPATIENT
Start: 2024-08-03 | End: 2024-08-03 | Stop reason: HOSPADM

## 2024-08-03 RX ORDER — HALOPERIDOL 5 MG/ML
5 INJECTION INTRAMUSCULAR
Status: DISCONTINUED | OUTPATIENT
Start: 2024-08-03 | End: 2024-08-03 | Stop reason: HOSPADM

## 2024-10-18 ENCOUNTER — HOSPITAL ENCOUNTER (EMERGENCY)
Facility: OTHER | Age: 24
Discharge: LEFT BEFORE DISCHARGE | End: 2024-10-18
Attending: EMERGENCY MEDICINE
Payer: MEDICAID

## 2024-10-18 VITALS
WEIGHT: 120 LBS | HEIGHT: 63 IN | DIASTOLIC BLOOD PRESSURE: 85 MMHG | BODY MASS INDEX: 21.26 KG/M2 | HEART RATE: 103 BPM | RESPIRATION RATE: 18 BRPM | SYSTOLIC BLOOD PRESSURE: 121 MMHG | OXYGEN SATURATION: 100 % | TEMPERATURE: 98 F

## 2024-10-18 DIAGNOSIS — F23 ACUTE PSYCHOSIS: Primary | ICD-10-CM

## 2024-10-18 DIAGNOSIS — R11.10 VOMITING, UNSPECIFIED VOMITING TYPE, UNSPECIFIED WHETHER NAUSEA PRESENT: ICD-10-CM

## 2024-10-18 LAB
ALBUMIN SERPL BCP-MCNC: 5 G/DL (ref 3.5–5.2)
ALP SERPL-CCNC: 82 U/L (ref 40–150)
ALT SERPL W/O P-5'-P-CCNC: 15 U/L (ref 10–44)
AMPHET+METHAMPHET UR QL: ABNORMAL
ANION GAP SERPL CALC-SCNC: 13 MMOL/L (ref 8–16)
APAP SERPL-MCNC: <3 UG/ML (ref 10–20)
AST SERPL-CCNC: 20 U/L (ref 10–40)
B-HCG UR QL: NEGATIVE
BACTERIA #/AREA URNS HPF: NORMAL /HPF
BARBITURATES UR QL SCN>200 NG/ML: NEGATIVE
BASOPHILS # BLD AUTO: 0.09 K/UL (ref 0–0.2)
BASOPHILS NFR BLD: 0.7 % (ref 0–1.9)
BENZODIAZ UR QL SCN>200 NG/ML: ABNORMAL
BILIRUB SERPL-MCNC: 0.4 MG/DL (ref 0.1–1)
BILIRUB UR QL STRIP: NEGATIVE
BUN SERPL-MCNC: 4 MG/DL (ref 6–20)
BZE UR QL SCN: ABNORMAL
CALCIUM SERPL-MCNC: 9.9 MG/DL (ref 8.7–10.5)
CANNABINOIDS UR QL SCN: NEGATIVE
CHLORIDE SERPL-SCNC: 102 MMOL/L (ref 95–110)
CLARITY UR: CLEAR
CO2 SERPL-SCNC: 23 MMOL/L (ref 23–29)
COLOR UR: YELLOW
CREAT SERPL-MCNC: 0.8 MG/DL (ref 0.5–1.4)
CREAT UR-MCNC: 80.1 MG/DL (ref 15–325)
CTP QC/QA: YES
CTP QC/QA: YES
DIFFERENTIAL METHOD BLD: ABNORMAL
EOSINOPHIL # BLD AUTO: 0.1 K/UL (ref 0–0.5)
EOSINOPHIL NFR BLD: 0.8 % (ref 0–8)
ERYTHROCYTE [DISTWIDTH] IN BLOOD BY AUTOMATED COUNT: 13.2 % (ref 11.5–14.5)
EST. GFR  (NO RACE VARIABLE): >60 ML/MIN/1.73 M^2
ETHANOL SERPL-MCNC: <10 MG/DL
GLUCOSE SERPL-MCNC: 100 MG/DL (ref 70–110)
GLUCOSE UR QL STRIP: NEGATIVE
HCT VFR BLD AUTO: 44.2 % (ref 37–48.5)
HGB BLD-MCNC: 14.8 G/DL (ref 12–16)
HGB UR QL STRIP: NEGATIVE
IMM GRANULOCYTES # BLD AUTO: 0.04 K/UL (ref 0–0.04)
IMM GRANULOCYTES NFR BLD AUTO: 0.3 % (ref 0–0.5)
KETONES UR QL STRIP: NEGATIVE
LEUKOCYTE ESTERASE UR QL STRIP: ABNORMAL
LYMPHOCYTES # BLD AUTO: 1.9 K/UL (ref 1–4.8)
LYMPHOCYTES NFR BLD: 15.4 % (ref 18–48)
MCH RBC QN AUTO: 29.3 PG (ref 27–31)
MCHC RBC AUTO-ENTMCNC: 33.5 G/DL (ref 32–36)
MCV RBC AUTO: 88 FL (ref 82–98)
METHADONE UR QL SCN>300 NG/ML: NEGATIVE
MICROSCOPIC COMMENT: NORMAL
MONOCYTES # BLD AUTO: 0.8 K/UL (ref 0.3–1)
MONOCYTES NFR BLD: 6.7 % (ref 4–15)
NEUTROPHILS # BLD AUTO: 9.4 K/UL (ref 1.8–7.7)
NEUTROPHILS NFR BLD: 76.1 % (ref 38–73)
NITRITE UR QL STRIP: NEGATIVE
NRBC BLD-RTO: 0 /100 WBC
OPIATES UR QL SCN: NEGATIVE
PCP UR QL SCN>25 NG/ML: NEGATIVE
PH UR STRIP: 7 [PH] (ref 5–8)
PLATELET # BLD AUTO: 304 K/UL (ref 150–450)
PMV BLD AUTO: 9.9 FL (ref 9.2–12.9)
POTASSIUM SERPL-SCNC: 3.6 MMOL/L (ref 3.5–5.1)
PROT SERPL-MCNC: 8.9 G/DL (ref 6–8.4)
PROT UR QL STRIP: NEGATIVE
RBC # BLD AUTO: 5.05 M/UL (ref 4–5.4)
RBC #/AREA URNS HPF: 1 /HPF (ref 0–4)
SARS-COV-2 RDRP RESP QL NAA+PROBE: NEGATIVE
SODIUM SERPL-SCNC: 138 MMOL/L (ref 136–145)
SP GR UR STRIP: 1.01 (ref 1–1.03)
SQUAMOUS #/AREA URNS HPF: 1 /HPF
TOXICOLOGY INFORMATION: ABNORMAL
TSH SERPL DL<=0.005 MIU/L-ACNC: 2.13 UIU/ML (ref 0.4–4)
URN SPEC COLLECT METH UR: ABNORMAL
UROBILINOGEN UR STRIP-ACNC: NEGATIVE EU/DL
WBC # BLD AUTO: 12.37 K/UL (ref 3.9–12.7)
WBC #/AREA URNS HPF: 3 /HPF (ref 0–5)

## 2024-10-18 PROCEDURE — 81000 URINALYSIS NONAUTO W/SCOPE: CPT | Mod: 59 | Performed by: EMERGENCY MEDICINE

## 2024-10-18 PROCEDURE — 87635 SARS-COV-2 COVID-19 AMP PRB: CPT | Performed by: EMERGENCY MEDICINE

## 2024-10-18 PROCEDURE — 84443 ASSAY THYROID STIM HORMONE: CPT | Performed by: EMERGENCY MEDICINE

## 2024-10-18 PROCEDURE — 99283 EMERGENCY DEPT VISIT LOW MDM: CPT

## 2024-10-18 PROCEDURE — 87491 CHLMYD TRACH DNA AMP PROBE: CPT | Performed by: EMERGENCY MEDICINE

## 2024-10-18 PROCEDURE — 80053 COMPREHEN METABOLIC PANEL: CPT | Performed by: EMERGENCY MEDICINE

## 2024-10-18 PROCEDURE — 80143 DRUG ASSAY ACETAMINOPHEN: CPT | Performed by: EMERGENCY MEDICINE

## 2024-10-18 PROCEDURE — 82077 ASSAY SPEC XCP UR&BREATH IA: CPT | Performed by: EMERGENCY MEDICINE

## 2024-10-18 PROCEDURE — 99205 OFFICE O/P NEW HI 60 MIN: CPT | Mod: 95,AF,HB, | Performed by: STUDENT IN AN ORGANIZED HEALTH CARE EDUCATION/TRAINING PROGRAM

## 2024-10-18 PROCEDURE — 87591 N.GONORRHOEAE DNA AMP PROB: CPT | Performed by: EMERGENCY MEDICINE

## 2024-10-18 PROCEDURE — 36415 COLL VENOUS BLD VENIPUNCTURE: CPT | Performed by: EMERGENCY MEDICINE

## 2024-10-18 PROCEDURE — 80307 DRUG TEST PRSMV CHEM ANLYZR: CPT | Performed by: EMERGENCY MEDICINE

## 2024-10-18 PROCEDURE — 85025 COMPLETE CBC W/AUTO DIFF WBC: CPT | Performed by: EMERGENCY MEDICINE

## 2024-10-18 RX ORDER — ONDANSETRON 4 MG/1
4 TABLET, ORALLY DISINTEGRATING ORAL
Status: DISCONTINUED | OUTPATIENT
Start: 2024-10-18 | End: 2024-10-18 | Stop reason: HOSPADM

## 2024-10-18 NOTE — ED NOTES
PEC order placed by ED MD, pt aware of current plan of care. Psych precautions put in place, pt changed into maroon paper scrubs. ED security at bedside to sit with pt, charting q15min observations per protocols.
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
## 2024-10-18 NOTE — CONSULTS
"[facility]  Psychiatry  Telepsychiatry Consult Note    Please see previous notes:    Patient agreeable to consultation via telepsychiatry.    Tele-Consultation from Psychiatry started: 10/18/2024 at 10:35AM  The chief complaint leading to psychiatric consultation is: "delusions, h/o schizoaffective d/o, non-compliant with meds"  This consultation was requested by Dr. Ian Jacobson, the Emergency Department attending physician.  The location of the consulting psychiatrist is [place], [place].  The patient location is  [facility] EMERGENCY DEPARTMENT   The patient arrived at the ED at: 09:12 today    Also present with the patient at the time of the consultation: self    Patient Identification:   Jailene Todd is a 23 y.o. female.    Patient information was obtained from patient, past medical records, ER records, and primary team.  Patient presented voluntarily to the Emergency Department by herself.    Inpatient consult to Telemedicine - Psychiatry  Consult performed by: Maura Sanchez MD  Consult ordered by: Ian Jacobson MD  Reason for consult: "delusions, h/o schizoaffective d/o, non-compliant with meds"        Consult Start Time: 10/18/2024 10:35 CDT          Subjective:     History of Present Illness:  Jailene Todd is a 23 y.o. woman with a past psychiatric history of paranoid schizophrenia and cocaine use disorder and not documented PMHx per chart review of problem list.    Per ED triage notes:  Chief Complaint   Patient presents with    Psychiatric Evaluation       States a woman and a man are "doing Yarsanism on me". States they're causing her to have "unnecessary bowel movements" and "vibration of my heart". Denies hallucinations or HI/SI.  During triage pt states "I feel them doing something to my body now".       Time seen by provider: 9:41 AM     This is a 23 y.o. female w/ h/o schizoaffective disorder presents for a psychiatric evaluation. The patient states she had been vomiting " "for the past 6 days but spontaneously recovered two days ago. She denies food poisoning and diarrhea and mentions she has been only consuming liquids. She denies auditory and visual hallucinations at this time, but she mentions that she believes her GI symptoms are likely due to an unknown woman who intends to harm her using Yarsani. She reports visiting several psychics for help over the past week and one of them recommended her to take some herbal supplements, which, she plans on taking eventually. The patient notes she has been prescribed antipsychotic medications but does not recall when she last took them. She mentions an incidence of a similar episode of bowel disturbance happening several months ago. She denies suicidal and homicidal ideation and states that she is currently homeless. This is the extent of the patient's complaints at this time.    Chief Complaint/Reason for Psychiatry Consult: evaluation for "delusions, h/o schizoaffective d/o, non-compliant with meds"    Patient's chart was reviewed. Multiple ED visits for anxiety, psychosis, cocaine abuse, sensations of foreign body. Most recent behavioral health hospitalization noted on 12/18/2023 to 12/27/2023 for paranoid schizophrenia. During this time she was given Aristada 1064 IM injection and started on Zoloft 25mg PO Qdaily and Trazodone 50mg PO QHS.    Pertinent labs:  UDS presumptive positive for cocaine, benzodiazepines, and amphetamines. UPT negative. COVID negative. CBC wnl. CMP wnl except for low BUN (4)    On interview today (at 10:56AM)  "It's not a good day today, I came here for an STD check and why did they take blood from me?" She says that she is worried about HIV, gonorrhea, chlamydia. Says that she had unprotected, consensual sex last night with someone she knows. When asked if she is worried about STDs, she becomes upset and says, "can't a woman come and get checked for her health without being put in scrubs?"    "I'm not insane, I'm " "not crazy, I'm in my right frame of mind." "I felt like I chose to do the right thing and I'm being punished for it." "I came here because I was concerned about my cycle." She says that "since they put that needle in me [to draw blood], I ain't been feeling right." She cannot further elaborate about what she is worried about related to her cycle. Says that she went to an [employer] shop yesterday for advice and was told to come here.    Psychiatric ROS:  -Denies suicidal thoughts  -Denies homicidal thoughts  -Denies hearing voices or seeing things  -Denies feeling depressed and anxious. "I feel angry because coming here didn't solve nothing at all." "As long as I'm alive and talking normal, nothing is wrong with me at all."    She says that she is not taking any medications currently. She is not feeling delusional. She is able to cooperative with the interview but is crying and says she is "upset because what would you be like if you came here seeking help and got asked to speak to someone like you."    Psychiatric History:   Previous Psychiatric Hospitalizations: Yes, at least one in system in December 2023 (see HPI for details)  Previous Medication Trials: Yes, Abilify, Zoloft, Trazodone, Remeron  Previous Suicide Attempts: unable to assess  History of Violence: unable to assess  History of Depression: denies  History of Becca: denies  History of Auditory/Visual Hallucination: yes, according to chart review  History of Delusions: yes, according to chart review  Outpatient psychiatrist (current & past): Yes, but cannot tell me details, only that this person is at [address]    Substance Abuse History:  Tobacco: YES, smokes 3 cigarettes a day  Alcohol: YES, "I probably drink three drinks a week"; denies blacking out, denies DUIs/DWIs  Illicit Substances: YES, marijuana and cocaine ("I use them on the weekends")  Detox/Rehab: "I've really tried to detox myself because of the foods I've been eating."    Legal " "History: Past charges/incarcerations: She declines to answer then says, "I don't have no trouble with the law."     Family Psychiatric History:   She declines to answer    Social History:  Developmental/Childhood: denies history of p/s/e trauma  Education: highest grade completed "12th grade"  Employment Status/Finances: works as a    Relationship Status/Sexual Orientation: single  Children: none  Housing Status: "I live in the [facility] with friends"    history:  No  Access to gun: No  Tenriism: "I believe in God and I'm also Spiritual"  Recreational activities: "I like to go out to eat. Seafood."    Psychiatric Mental Status Exam:  Arousal: alert  Sensorium/Orientation: oriented to self, place, situation, month and year  Behavior/Cooperation:    Speech: spontaneous, punctuated and at times angry tone  Language: able to name  Mood: " angry "   Affect: reactive  Thought Process: linear, logical, coherent, perseverative on "just wanting to get checked out"  Thought Content:   Auditory hallucinations: Denies  Visual hallucinations: Denies  Paranoia: "I don't feel safe in here [hospital] at all."  Delusions:  None verbalized during interview  Suicidal ideation: Denies  Homicidal ideation: Denies  Attention/Concentration: intact to conversation  Memory:    Recent:  intact   Remote: intact  Fund of Knowledge: Able to name current and previous two presidents  Insight: intact  Judgment: appropriate, intact to situation      Past Medical History:   Past Medical History:   Diagnosis Date    Psychiatric disorder       Past Surgical History:  No past surgical history on file.    Laboratory Data:   Labs Reviewed   CBC W/ AUTO DIFFERENTIAL - Abnormal       Result Value    WBC 12.37      RBC 5.05      Hemoglobin 14.8      Hematocrit 44.2      MCV 88      MCH 29.3      MCHC 33.5      RDW 13.2      Platelets 304      MPV 9.9      Immature Granulocytes 0.3      Gran # (ANC) 9.4 (*)     Immature Grans (Abs) " 0.04      Lymph # 1.9      Mono # 0.8      Eos # 0.1      Baso # 0.09      nRBC 0      Gran % 76.1 (*)     Lymph % 15.4 (*)     Mono % 6.7      Eosinophil % 0.8      Basophil % 0.7      Differential Method Automated     C. TRACHOMATIS/N. GONORRHOEAE BY AMP DNA   COMPREHENSIVE METABOLIC PANEL   TSH   URINALYSIS, REFLEX TO URINE CULTURE   DRUG SCREEN PANEL, URINE EMERGENCY   ALCOHOL,MEDICAL (ETHANOL)   ACETAMINOPHEN LEVEL   URINALYSIS MICROSCOPIC   SARS-COV-2 RDRP GENE    POC Rapid COVID Negative       Acceptable Yes         Neurological History:  Seizures: Denies  Head trauma: Denies    Allergies: NKA  Review of patient's allergies indicates:  No Known Allergies    Medications in ER: Medications - No data to display    Medications at home:   Current Outpatient Medications   Medication Instructions    OXcarbazepine (TRILEPTAL) 150 mg, Oral, 2 times daily    sertraline (ZOLOFT) 25 mg, Oral, Daily    traZODone (DESYREL) 50 mg, Oral, Nightly        No new subjective & objective note has been filed under this hospital service since the last note was generated.      Assessment - Diagnosis - Goals:     Diagnosis/Impression:   Jailene Todd is a 23 y.o. young woman with a history of paranoid schizophrenia and cocaine use disorder per chart review. On interview with this provider today, she seems appropriately upset by the chain of events leading up to her psychiatric interview with this provider. She relays that she hoped to seek medical advice for issues she has been having with her menstrual cycle and to get tested for STDs following a consensual, unprotected sexual encounter she had last night. She was linear and logical in her thought process with this provider, albeit emotional and distraught. She did not verbalize any psychotic symptoms nor was she observed to be responding to internal stimuli and was not delusional during her interview with this provider. We discussed her belief in Rastafari and   herbology which I believe is a culturally appropriate belief of hers that is not bordering on magical thinking today. In reviewing her past psychiatric history, she likely has had psychotic symptoms in the past in the context of substance abuse.    Differential Diagnoses:  -Polysubstance use disorder (benzodiazepines, amphetamines and cocaine presumptive + in UDS today)  -History of paranoid schizophrenia and cocaine use disorder - most likely substance-induced psychosis at this point  -Unspecified personality disorder, suspected given low level of distress tolerance    Rec:   -LEGAL: No indication to PEC at this time as patient is not posing a danger to herself, others, nor is she gravely disabled    -DISPO: home to self    -FOLLOW UP: Would recommend that Jailene seek consistent follow up with a PCP, gynecologist and mental health provider. [facility] and [facility] are good resources. Additionally if CM/SW can assist with scheduling follow ups for the aforementioned services, patient would benefit from consistent medical follow up vs ED visits for outpatient complaints    -OTHER: To maintain good rapport with patient would explain to patient the results of lab testing done while in the ED.    -MEDS: No medications indicated at this time. Suspect that previous psychosis was in the context of substance use. Hence, regular and consistent follow up would be important for primary care, OB/GYN and behavioral health    Plan of Care communicated to: Dr. Jacobson, ED attending    Time with patient: 41mins; 70mins total spent on this case including chart review, documentation, discussion with ED staff.     More than 50% of the time was spent counseling/coordinating care    Consulting clinician was informed of the encounter and consult note.    Consultation ended: 10/18/2024 at 11:45AM    Maura Sanchez MD   Psychiatry  [facility]

## 2024-10-18 NOTE — ED NOTES
"Pt found ambulating in hallway with all belongings toward ED exit with all belongings and bags in hand. Pt was asked where she was going, no orders for discharge had been placed by the ED MD. Pt states "I'm not staying here no more. I don't wanna see none of y'all". Pt declined any further assistance from ED staff, requesting to leave. Pt shown ED exit, pt was able to ambulate off unit with even and steady gait, NAD noted. ED MD aware. Pt eloped.   "

## 2024-10-19 LAB
C TRACH DNA SPEC QL NAA+PROBE: NOT DETECTED
N GONORRHOEA DNA SPEC QL NAA+PROBE: NOT DETECTED